# Patient Record
Sex: MALE | Race: WHITE | Employment: FULL TIME | URBAN - METROPOLITAN AREA
[De-identification: names, ages, dates, MRNs, and addresses within clinical notes are randomized per-mention and may not be internally consistent; named-entity substitution may affect disease eponyms.]

---

## 2017-06-30 ENCOUNTER — OFFICE VISIT (OUTPATIENT)
Dept: FAMILY MEDICINE | Facility: CLINIC | Age: 52
End: 2017-06-30
Payer: COMMERCIAL

## 2017-06-30 ENCOUNTER — RADIANT APPOINTMENT (OUTPATIENT)
Dept: GENERAL RADIOLOGY | Facility: CLINIC | Age: 52
End: 2017-06-30
Attending: NURSE PRACTITIONER
Payer: COMMERCIAL

## 2017-06-30 VITALS
BODY MASS INDEX: 23.99 KG/M2 | TEMPERATURE: 97.1 F | RESPIRATION RATE: 16 BRPM | HEIGHT: 69 IN | DIASTOLIC BLOOD PRESSURE: 74 MMHG | HEART RATE: 56 BPM | SYSTOLIC BLOOD PRESSURE: 132 MMHG | WEIGHT: 162 LBS

## 2017-06-30 DIAGNOSIS — M25.511 ACUTE PAIN OF RIGHT SHOULDER: ICD-10-CM

## 2017-06-30 DIAGNOSIS — M25.511 ACUTE PAIN OF RIGHT SHOULDER: Primary | ICD-10-CM

## 2017-06-30 PROCEDURE — 99213 OFFICE O/P EST LOW 20 MIN: CPT | Performed by: NURSE PRACTITIONER

## 2017-06-30 PROCEDURE — 73030 X-RAY EXAM OF SHOULDER: CPT | Mod: RT

## 2017-06-30 ASSESSMENT — PAIN SCALES - GENERAL: PAINLEVEL: EXTREME PAIN (8)

## 2017-06-30 NOTE — NURSING NOTE
"Chief Complaint   Patient presents with     Shoulder       Initial /74 (BP Location: Right arm, Patient Position: Chair, Cuff Size: Adult Regular)  Pulse 56  Temp 97.1  F (36.2  C) (Oral)  Resp 16  Ht 5' 9.25\" (1.759 m)  Wt 162 lb (73.5 kg)  BMI 23.75 kg/m2 Estimated body mass index is 23.75 kg/(m^2) as calculated from the following:    Height as of this encounter: 5' 9.25\" (1.759 m).    Weight as of this encounter: 162 lb (73.5 kg).  Medication Reconciliation: complete  "

## 2017-06-30 NOTE — PATIENT INSTRUCTIONS
Will let you know results of x ray.  Start PT.  If this doesn't resolve, will send to orthopedics for further evaluation.  Thyroid blood test is due next month.  Follow up if symptoms persist or worsen and as needed.        Thank you for choosing [facility].  You may be receiving a survey in the mail from [facility] regarding your visit today.  Please take a few minutes to complete and return the survey to let us know how we are doing.      Our Clinic hours are:  Mondays    7:20 am - 7 pm  Tues -  Fri  7:20 am - 5 pm    Clinic Phone: [phone]    The clinic lab opens at 7:30 am Mon - Fri and appointments are required.    [facility]. [phone]  Monday-Thursday 8 am - 7pm  Tues/Wed/Fri 8 am - 5:30 pm

## 2017-06-30 NOTE — MR AVS SNAPSHOT
After Visit Summary   6/30/2017    Ken Brock    MRN: [id number]           Patient Information     Date Of Birth          1965        Visit Information        Provider Department      6/30/2017 3:00 PM Kirsten Tavarez APRN CNP [facility] [place][note 2]        Today's Diagnoses     Acute pain of right shoulder    -  1      Care Instructions    Will let you know results of x ray.  Start PT.  If this doesn't resolve, will send to orthopedics for further evaluation.  Thyroid blood test is due next month.  Follow up if symptoms persist or worsen and as needed.        Thank you for choosing [facility].  You may be receiving a survey in the mail from [employer] regarding your visit today.  Please take a few minutes to complete and return the survey to let us know how we are doing.      Our Clinic hours are:  Mondays    7:20 am - 7 pm  Tues -  Fri  7:20 am - 5 pm    Clinic Phone: [phone]    The clinic lab opens at 7:30 am Mon - Fri and appointments are required.    [facility]  Ph. [phone]  Monday-Thursday 8 am - 7pm  Tues/Wed/Fri 8 am - 5:30 pm                 Follow-ups after your visit        Additional Services     PHYSICAL THERAPY REFERRAL       *This therapy referral will be filtered to a centralized scheduling office at [facility] and the patient will receive a call to schedule an appointment at a [place] location most convenient for them. *     [facility] provides Physical Therapy evaluation and treatment and many specialty services across the [place] system.  If requesting a specialty program, please choose from the list below.    If you have not heard from the scheduling office within 2 business days, please call [phone] for all locations, with the exception of [place], please call [phone].  Treatment: Evaluation & Treatment  Special Instructions/Modalities:   Special Programs:     Please  "be aware that coverage of these services is subject to the terms and limitations of your health insurance plan.  Call member services at your health plan with any benefit or coverage questions.      **Note to Provider:  If you are referring outside of [place] for the therapy appointment, please list the name of the location in the \"special instructions\" above, print the referral and give to the patient to schedule the appointment.                  Future tests that were ordered for you today     Open Future Orders        Priority Expected Expires Ordered    XR Shoulder Right G/E 3 Views Routine 6/30/2017 6/30/2018 6/30/2017            Who to contact     If you have questions or need follow up information about today's clinic visit or your schedule please contact [facility] directly at [phone].  Normal or non-critical lab and imaging results will be communicated to you by [employer], letter or phone within 4 business days after the clinic has received the results. If you do not hear from us within 7 days, please contact the clinic through [employer] or phone. If you have a critical or abnormal lab result, we will notify you by phone as soon as possible.  Submit refill requests through [employer] or call your pharmacy and they will forward the refill request to us. Please allow 3 business days for your refill to be completed.          Additional Information About Your Visit        [employer] Information     [employer] lets you send messages to your doctor, view your test results, renew your prescriptions, schedule appointments and more. To sign up, go to [email or web address] . Click on \"Log in\" on the left side of the screen, which will take you to the Welcome page. Then click on \"Sign up Now\" on the right side of the page.     You will be asked to enter the access code listed below, as well as some personal information. Please follow the directions to create your username and password.     Your access " "code is: [email or web address]  Expires: 2017  3:23 PM     Your access code will  in 90 days. If you need help or a new code, please call your [place] clinic or [phone].        Care EveryWhere ID     This is your Care EveryWhere ID. This could be used by other organizations to access your [place] medical records  [id number]        Your Vitals Were     Pulse Temperature Respirations Height BMI (Body Mass Index)       56 97.1  F (36.2  C) (Oral) 16 5' 9.25\" (1.759 m) 23.75 kg/m2        Blood Pressure from Last 3 Encounters:   17 132/74   16 122/70   05/22/15 104/60    Weight from Last 3 Encounters:   17 162 lb (73.5 kg)   16 169 lb 12.8 oz (77 kg)   05/22/15 173 lb 12.8 oz (78.8 kg)              We Performed the Following     PHYSICAL THERAPY REFERRAL        Primary Care Provider Office Phone # Fax #    R David Louis [phone]       [place] [address]        Equal Access to Services     OSVALDO CHOI AH: Hadii bandar gibbs hadasho Soomaali, waaxda luqadaha, qaybta kaalmada adeegyada, kait kohler. So [facility] [phone].    ATENCIÓN: Si habla español, tiene a verma disposición servicios gratuitos de asistencia lingüística. Llame al [phone].    We comply with applicable federal civil rights laws and [place] laws. We do not discriminate on the basis of race, color, national origin, age, disability sex, sexual orientation or gender identity.            Thank you!     Thank you for choosing [facility]  for your care. Our goal is always to provide you with excellent care. Hearing back from our patients is one way we can continue to improve our services. Please take a few minutes to complete the written survey that you may receive in the mail after your visit with us. Thank you!             Your Updated Medication List - Protect others around you: Learn how to safely use, store and " throw away your medicines at www.disposemymeds.org.          This list is accurate as of: 6/30/17  3:23 PM.  Always use your most recent med list.                   Brand Name Dispense Instructions for use Diagnosis    levothyroxine 112 MCG tablet    SYNTHROID/LEVOTHROID    180 tablet    Take 2 tablets daily    Acquired hypothyroidism

## 2017-06-30 NOTE — PROGRESS NOTES
SUBJECTIVE:                                                    Ken Brock is a 51 year old male who presents to clinic today for the following health issues:      Musculoskeletal problem/pain      Duration: hurt the shoulder 1 1/2 years ago but never got better. Now it is hurt in a different spot.    Description  Location: right shoulder    Intensity:  0-8/10    Accompanying signs and symptoms: radiation of pain to down the right arm, numbness, tingling, weakness of right arm and warmth    History  Previous similar problem: YES- 1 1/2 ago  Previous evaluation:  none    Precipitating or alleviating factors:  Trauma or overuse: YES- over use at work  Aggravating factors include: lifting, exercise and overuse    Therapies tried and outcome: rest/inactivity, heat, ice, massage and stretching          Problem list and histories reviewed & adjusted, as indicated.  Additional history: states he had similar problems 1.5 years ago. He's working at same job, [employer] doing delivery work.  He is right hand dominant and also sleeps on right side. Denies any specific injury or strain.  Pain is worse with certain movements and typically on the outer or front of shoulder.  States PT worked for him before.  No other joint pain.  He states he's feeling fine otherwise. Due for thyroid re check next month.      Patient Active Problem List   Diagnosis     Toxic diffuse goiter     CARDIOVASCULAR SCREENING; LDL GOAL LESS THAN 160     Acquired hypothyroidism     Past Surgical History:   Procedure Laterality Date     HC VASECTOMY UNILAT/BILAT W POSTOP SEMEN  2000    Vasectomy     SURGICAL HISTORY OF -   2005     Hand Carpal Tunnel, left       Social History   Substance Use Topics     Smoking status: Never Smoker     Smokeless tobacco: Current User     Types: Snuff      Comment: 1 can a week     Alcohol use Yes      Comment: occ     Family History   Problem Relation Age of Onset     Genitourinary Problems Father      kidney      "Cardiovascular Father      MI age 66     Cancer - colorectal Maternal Grandmother      CANCER Maternal Grandfather      Cancer - colorectal Paternal Grandfather      CEREBROVASCULAR DISEASE Mother      stroke         Current Outpatient Prescriptions   Medication Sig Dispense Refill     levothyroxine (SYNTHROID, LEVOTHROID) 112 MCG tablet Take 2 tablets daily 180 tablet 3     Allergies   Allergen Reactions     Amoxicillin Rash       Reviewed and updated as needed this visit by clinical staff  Tobacco  Allergies  Med Hx  Surg Hx  Fam Hx  Soc Hx      Reviewed and updated as needed this visit by Provider         10 point ROS of systems including Constitutional, Eyes, Respiratory, Cardiovascular, Gastroenterology, Genitourinary, Integumentary, Muscularskeletal, Psychiatric were all negative except for pertinent positives noted in my HPI.    OBJECTIVE:     /74 (BP Location: Right arm, Patient Position: Chair, Cuff Size: Adult Regular)  Pulse 56  Temp 97.1  F (36.2  C) (Oral)  Resp 16  Ht 5' 9.25\" (1.759 m)  Wt 162 lb (73.5 kg)  BMI 23.75 kg/m2  Body mass index is 23.75 kg/(m^2).  GENERAL: healthy, alert and no distress  HENT: pharynx without erythema  NECK: no adenopathy, no asymmetry  RESP: lungs clear to auscultation - no rales, rhonchi or wheezes  CV: regular rate and rhythm, normal S1 S2, no S3 or S4, no murmur  MS: no gross musculoskeletal defects noted, he has FROM of right shoulder with pain noted, pain is in outer right shoulder, pain with internal rotation      Diagnostic Test Results:  none     ASSESSMENT/PLAN:             1. Acute pain of right shoulder    - XR Shoulder Right G/E 3 Views; Future  - PHYSICAL THERAPY REFERRAL    See Patient Instructions  Patient Instructions   Will let you know results of x ray.  Start PT.  If this doesn't resolve, will send to orthopedics for further evaluation.  Thyroid blood test is due next month.  Follow up if symptoms persist or worsen and as " needed.        Thank you for choosing [facility].  You may be receiving a survey in the mail from [facility] regarding your visit today.  Please take a few minutes to complete and return the survey to let us know how we are doing.      Our Clinic hours are:  Mondays    7:20 am - 7 pm  Tues -  Fri  7:20 am - 5 pm    Clinic Phone: [phone]    The clinic lab opens at 7:30 am Mon - Fri and appointments are required.    [facility] [place]  Ph. [phone]  Monday-Thursday 8 am - 7pm  Tues/Wed/Fri 8 am - 5:30 pm             NATHALIE Mckeon CNP  [facility]

## 2017-11-19 DIAGNOSIS — E03.9 ACQUIRED HYPOTHYROIDISM: ICD-10-CM

## 2017-11-21 RX ORDER — LEVOTHYROXINE SODIUM 112 UG/1
224 TABLET ORAL DAILY
Qty: 60 TABLET | Refills: 0 | Status: SHIPPED | OUTPATIENT
Start: 2017-11-21 | End: 2017-12-22

## 2017-12-22 ENCOUNTER — OFFICE VISIT (OUTPATIENT)
Dept: FAMILY MEDICINE | Facility: CLINIC | Age: 52
End: 2017-12-22
Payer: COMMERCIAL

## 2017-12-22 VITALS
WEIGHT: 173.4 LBS | HEIGHT: 69 IN | BODY MASS INDEX: 25.68 KG/M2 | HEART RATE: 56 BPM | DIASTOLIC BLOOD PRESSURE: 80 MMHG | SYSTOLIC BLOOD PRESSURE: 128 MMHG

## 2017-12-22 DIAGNOSIS — E03.9 ACQUIRED HYPOTHYROIDISM: ICD-10-CM

## 2017-12-22 DIAGNOSIS — Z12.11 COLON CANCER SCREENING: ICD-10-CM

## 2017-12-22 DIAGNOSIS — Z00.00 ROUTINE GENERAL MEDICAL EXAMINATION AT A HEALTH CARE FACILITY: Primary | ICD-10-CM

## 2017-12-22 LAB
T4 FREE SERPL-MCNC: 1.44 NG/DL (ref 0.76–1.46)
TSH SERPL DL<=0.005 MIU/L-ACNC: 0.29 MU/L (ref 0.4–4)

## 2017-12-22 PROCEDURE — 36415 COLL VENOUS BLD VENIPUNCTURE: CPT | Performed by: FAMILY MEDICINE

## 2017-12-22 PROCEDURE — 84443 ASSAY THYROID STIM HORMONE: CPT | Performed by: FAMILY MEDICINE

## 2017-12-22 PROCEDURE — 99396 PREV VISIT EST AGE 40-64: CPT | Performed by: FAMILY MEDICINE

## 2017-12-22 PROCEDURE — 84439 ASSAY OF FREE THYROXINE: CPT | Performed by: FAMILY MEDICINE

## 2017-12-22 RX ORDER — LEVOTHYROXINE SODIUM 112 UG/1
224 TABLET ORAL DAILY
Qty: 180 TABLET | Refills: 3 | Status: SHIPPED | OUTPATIENT
Start: 2017-12-22 | End: 2018-12-29

## 2017-12-22 NOTE — PROGRESS NOTES
SUBJECTIVE:   CC: Ken Brock is an 52 year old male who presents for preventative health visit.     Healthy Habits:    Do you get at least three servings of calcium containing foods daily (dairy, green leafy vegetables, etc.)? no, taking calcium and/or vitamin D supplement: no    Amount of exercise or daily activities, outside of work: very active at work    Problems taking medications regularly No    Medication side effects: No    Have you had an eye exam in the past two years? no    Do you see a dentist twice per year? yes    Do you have sleep apnea, excessive snoring or daytime drowsiness?no         PROBLEMS TO ADD ON...  Hypothyroidism Follow-up      Since last visit, patient describes the following symptoms: Weight stable, no hair loss, no skin changes, no constipation, no loose stools        Today's PHQ-2 Score: PHQ-2 ( 1999 [employer]) 12/22/2017 6/30/2017   Q1: Little interest or pleasure in doing things 0 0   Q2: Feeling down, depressed or hopeless 0 0   PHQ-2 Score 0 0         Abuse: Current or Past(Physical, Sexual or Emotional)- No  Do you feel safe in your environment - Yes  Social History   Substance Use Topics     Smoking status: Never Smoker     Smokeless tobacco: Current User     Types: Snuff      Comment: 1 can a week     Alcohol use Yes      Comment: occ      If you drink alcohol do you typically have >3 drinks per day or >7 drinks per week? No                      Last PSA: No results found for: PSA    Reviewed orders with patient. Reviewed health maintenance and updated orders accordingly - Yes      Reviewed and updated as needed this visit by clinical staff  Tobacco  Allergies  Med Hx  Surg Hx  Fam Hx  Soc Hx        Reviewed and updated as needed this visit by Provider            ROS:  C: NEGATIVE for fever, chills, change in weight  I: NEGATIVE for worrisome rashes, moles or lesions  E: NEGATIVE for vision changes or irritation  ENT: NEGATIVE for ear, mouth and throat problems  R:  "NEGATIVE for significant cough or SOB  CV: NEGATIVE for chest pain, palpitations or peripheral edema  GI: NEGATIVE for nausea, abdominal pain, heartburn, or change in bowel habits   male: negative for dysuria, hematuria, decreased urinary stream, erectile dysfunction, urethral discharge  M: NEGATIVE for significant arthralgias or myalgia  N: NEGATIVE for weakness, dizziness or paresthesias  P: NEGATIVE for changes in mood or affect    OBJECTIVE:   /80 (BP Location: Right arm, Patient Position: Chair, Cuff Size: Adult Regular)  Pulse 56  Ht 5' 9.25\" (1.759 m)  Wt 173 lb 6.4 oz (78.7 kg)  BMI 25.42 kg/m2  EXAM:  GENERAL: healthy, alert and no distress  EYES: Eyes grossly normal to inspection, PERRL and conjunctivae and sclerae normal  HENT: ear canals and TM's normal, nose and mouth without ulcers or lesions  NECK: no adenopathy, no asymmetry, masses, or scars and thyroid normal to palpation  RESP: lungs clear to auscultation - no rales, rhonchi or wheezes  CV: regular rate and rhythm, normal S1 S2, no S3 or S4, no murmur, click or rub, no peripheral edema and peripheral pulses strong  ABDOMEN: soft, nontender, no hepatosplenomegaly, no masses and bowel sounds normal  MS: no gross musculoskeletal defects noted, no edema  SKIN: no suspicious lesions or rashes  NEURO: Normal strength and tone, mentation intact and speech normal  PSYCH: mentation appears normal, affect normal/bright    Results for orders placed or performed in visit on 12/22/17   TSH with free T4 reflex   Result Value Ref Range    TSH 0.29 (L) 0.40 - 4.00 mU/L      ASSESSMENT/PLAN:     ASSESSMENT:  1. Routine general medical examination at a health care facility    2. Acquired hypothyroidism    3. Colon cancer screening        PLAN:  Orders Placed This Encounter     Fecal colorectal cancer screen (FIT)     TSH with free T4 reflex     We will continue the current dose of thyroid medication    Patient Instructions     Preventive Health " Recommendations  Male Ages 50 - 64    Yearly exam:             See your health care provider every year in order to  o   Review health changes.   o   Discuss preventive care.    o   Review your medicines if your doctor has prescribed any.     Have a cholesterol test every 5 years, or more frequently if you are at risk for high cholesterol/heart disease.     Have a diabetes test (fasting glucose) every three years. If you are at risk for diabetes, you should have this test more often.     Have a colonoscopy at age 50, or have a yearly FIT test (stool test). These exams will check for colon cancer.      Talk with your health care provider about whether or not a prostate cancer screening test (PSA) is right for you.    You should be tested each year for STDs (sexually transmitted diseases), if you re at risk.     Shots: Get a flu shot each year. Get a tetanus shot every 10 years.     Nutrition:    Eat at least 5 servings of fruits and vegetables daily.     Eat whole-grain bread, whole-wheat pasta and brown rice instead of white grains and rice.     Talk to your provider about Calcium and Vitamin D.     Lifestyle    Exercise for at least 150 minutes a week (30 minutes a day, 5 days a week). This will help you control your weight and prevent disease.     Limit alcohol to one drink per day.     No smoking.     Wear sunscreen to prevent skin cancer.     See your dentist every six months for an exam and cleaning.     See your eye doctor every 1 to 2 years.       COUNSELING:  Reviewed preventive health counseling, as reflected in patient instructions       Regular exercise       Healthy diet/nutrition       Vision screening       Hearing screening       reports that he has never smoked. His smokeless tobacco use includes Snuff.  Tobacco Cessation Action Plan: Information offered: Patient not interested at this time  Estimated body mass index is 25.42 kg/(m^2) as calculated from the following:    Height as of this encounter:  "5' 9.25\" (1.759 m).    Weight as of this encounter: 173 lb 6.4 oz (78.7 kg).   Weight management plan: Discussed healthy diet and exercise guidelines and patient will follow up in 12 months in clinic to re-evaluate.    Counseling Resources:  ATP IV Guidelines  Pooled Cohorts Equation Calculator  FRAX Risk Assessment  ICSI Preventive Guidelines  Dietary Guidelines for Americans, 2010  USDA's MyPlate  ASA Prophylaxis  Lung CA Screening    ANJANA Louis MD  [facility]  "

## 2017-12-22 NOTE — NURSING NOTE
"Chief Complaint   Patient presents with     Physical     Refill Request     on medications     Health Maintenance     pt. is due for colonoscopy.        Initial /80 (BP Location: Right arm, Patient Position: Chair, Cuff Size: Adult Regular)  Pulse 56  Ht 5' 9.25\" (1.759 m)  Wt 173 lb 6.4 oz (78.7 kg)  BMI 25.42 kg/m2 Estimated body mass index is 25.42 kg/(m^2) as calculated from the following:    Height as of this encounter: 5' 9.25\" (1.759 m).    Weight as of this encounter: 173 lb 6.4 oz (78.7 kg).  Medication Reconciliation: complete     Lula Sandhu, CMA      "

## 2017-12-22 NOTE — LETTER
December 26, 2017      Ken Brock  [address] [address]        Dear ,    We are writing to inform you of your test results.    T4 level was normal.   Continue same medications and repeat in one year or if any symptoms of concern.    Results for orders placed or performed in visit on 12/22/17   TSH with free T4 reflex   Result Value Ref Range    TSH 0.29 (L) 0.40 - 4.00 mU/L   T4 free   Result Value Ref Range    T4 Free 1.44 0.76 - 1.46 ng/dL         If you have any questions or concerns, please call the clinic at the number listed above.       Sincerely,        ANJANA Louis MD/ NATHALIE Sanders CNP/la

## 2017-12-22 NOTE — MR AVS SNAPSHOT
After Visit Summary   12/22/2017    Ken Brock    MRN: [id number]           Patient Information     Date Of Birth          1965        Visit Information        Provider Department      12/22/2017 9:40 AM ANJANA Louis MD [facility]        Today's Diagnoses     Routine general medical examination at a health care facility    -  1    Colon cancer screening        Acquired hypothyroidism          Care Instructions      Preventive Health Recommendations  Male Ages 50 - 64    Yearly exam:             See your health care provider every year in order to  o   Review health changes.   o   Discuss preventive care.    o   Review your medicines if your doctor has prescribed any.     Have a cholesterol test every 5 years, or more frequently if you are at risk for high cholesterol/heart disease.     Have a diabetes test (fasting glucose) every three years. If you are at risk for diabetes, you should have this test more often.     Have a colonoscopy at age 50, or have a yearly FIT test (stool test). These exams will check for colon cancer.      Talk with your health care provider about whether or not a prostate cancer screening test (PSA) is right for you.    You should be tested each year for STDs (sexually transmitted diseases), if you re at risk.     Shots: Get a flu shot each year. Get a tetanus shot every 10 years.     Nutrition:    Eat at least 5 servings of fruits and vegetables daily.     Eat whole-grain bread, whole-wheat pasta and brown rice instead of white grains and rice.     Talk to your provider about Calcium and Vitamin D.     Lifestyle    Exercise for at least 150 minutes a week (30 minutes a day, 5 days a week). This will help you control your weight and prevent disease.     Limit alcohol to one drink per day.     No smoking.     Wear sunscreen to prevent skin cancer.     See your dentist every six months for an exam and cleaning.     See your eye doctor every 1 to 2  "years.            Follow-ups after your visit        Future tests that were ordered for you today     Open Future Orders        Priority Expected Expires Ordered    Fecal colorectal cancer screen (FIT) Routine 2018 3/16/2018 2017            Who to contact     If you have questions or need follow up information about today's clinic visit or your schedule please contact [facility] directly at [phone].  Normal or non-critical lab and imaging results will be communicated to you by UberMediahart, letter or phone within 4 business days after the clinic has received the results. If you do not hear from us within 7 days, please contact the clinic through UberMediahart or phone. If you have a critical or abnormal lab result, we will notify you by phone as soon as possible.  Submit refill requests through Language Learning Class or call your pharmacy and they will forward the refill request to us. Please allow 3 business days for your refill to be completed.          Additional Information About Your Visit        UberMediaharEduKart Information     Language Learning Class lets you send messages to your doctor, view your test results, renew your prescriptions, schedule appointments and more. To sign up, go to [email or web address] . Click on \"Log in\" on the left side of the screen, which will take you to the Welcome page. Then click on \"Sign up Now\" on the right side of the page.     You will be asked to enter the access code listed below, as well as some personal information. Please follow the directions to create your username and password.     Your access code is: [email or web address]  Expires: 3/22/2018 10:15 AM     Your access code will  in 90 days. If you need help or a new code, please call your [place] clinic or [phone].        Care EveryWhere ID     This is your Care EveryWhere ID. This could be used by other organizations to access your [place] medical records  [id number]        Your Vitals Were     Pulse Height BMI (Body Mass " "Index)             56 5' 9.25\" (1.759 m) 25.42 kg/m2          Blood Pressure from Last 3 Encounters:   12/22/17 128/80   06/30/17 132/74   07/29/16 122/70    Weight from Last 3 Encounters:   12/22/17 173 lb 6.4 oz (78.7 kg)   06/30/17 162 lb (73.5 kg)   07/29/16 169 lb 12.8 oz (77 kg)              We Performed the Following     TSH with free T4 reflex        Primary Care Provider Office Phone # Fax #    R David Louis [phone] [address] [facility] [address]        Equal Access to Services     OSVALDO CHOI : Marco A Bajwa, allen tripp, suman fraser, kait kohler. So [facility] [phone].    ATENCIÓN: Si habla español, tiene a verma disposición servicios gratuitos de asistencia lingüística. Llame al [phone].    We comply with applicable federal civil rights laws and [place] laws. We do not discriminate on the basis of race, color, national origin, age, disability, sex, sexual orientation, or gender identity.            Thank you!     Thank you for choosing [facility]  for your care. Our goal is always to provide you with excellent care. Hearing back from our patients is one way we can continue to improve our services. Please take a few minutes to complete the written survey that you may receive in the mail after your visit with us. Thank you!             Your Updated Medication List - Protect others around you: Learn how to safely use, store and throw away your medicines at www.disposemymeds.org.          This list is accurate as of: 12/22/17 10:15 AM.  Always use your most recent med list.                   Brand Name Dispense Instructions for use Diagnosis    levothyroxine 112 MCG tablet    SYNTHROID/LEVOTHROID    60 tablet    Take 2 tablets (224 mcg) by mouth daily (Needs follow-up appointment for this medication)    Acquired hypothyroidism         "

## 2017-12-23 NOTE — PROGRESS NOTES
Please SEND LETTER    Your TSH is just slightly low.  Continue the current dose of thyroid medication

## 2018-01-26 DIAGNOSIS — Z12.11 COLON CANCER SCREENING: ICD-10-CM

## 2018-01-26 PROCEDURE — 82274 ASSAY TEST FOR BLOOD FECAL: CPT | Performed by: FAMILY MEDICINE

## 2018-01-26 NOTE — LETTER
[facility]  [address]  [place] [address]  Phone: [phone]      1/29/2018     Ken Brock  [address] [address] [address]      Dear Ken:    Thank you for allowing me to participate in your care. Your recent test results were reviewed and listed below.      Your take home screening test for colon cancer was negative/normal. We will repeat this test again next year for your annual screening. If you have any additional questions or concerns please call the clinic at [phone].  Results for orders placed or performed in visit on 01/26/18   Fecal colorectal cancer screen (FIT)   Result Value Ref Range    Occult Blood Scn FIT Negative NEG^Negative       Thank you for choosing [facility]. As a result, please continue with the treatment plan discussed in the office. Return as discussed or sooner if symptoms worsen or fail to improve. If you have any further questions or concerns, please do not hesitate to contact us.      Sincerely,        Dr. David Louis/Malou Silva CMA

## 2018-01-28 LAB — HEMOCCULT STL QL IA: NEGATIVE

## 2019-02-08 ENCOUNTER — OFFICE VISIT (OUTPATIENT)
Dept: FAMILY MEDICINE | Facility: CLINIC | Age: 54
End: 2019-02-08
Payer: COMMERCIAL

## 2019-02-08 VITALS
HEART RATE: 64 BPM | OXYGEN SATURATION: 99 % | HEIGHT: 69 IN | DIASTOLIC BLOOD PRESSURE: 70 MMHG | BODY MASS INDEX: 25.77 KG/M2 | SYSTOLIC BLOOD PRESSURE: 108 MMHG | TEMPERATURE: 97.8 F | WEIGHT: 174 LBS

## 2019-02-08 DIAGNOSIS — E03.9 ACQUIRED HYPOTHYROIDISM: Primary | ICD-10-CM

## 2019-02-08 LAB
T4 FREE SERPL-MCNC: 1.1 NG/DL (ref 0.76–1.46)
TSH SERPL DL<=0.005 MIU/L-ACNC: 0.08 MU/L (ref 0.4–4)

## 2019-02-08 PROCEDURE — 84443 ASSAY THYROID STIM HORMONE: CPT | Performed by: FAMILY MEDICINE

## 2019-02-08 PROCEDURE — 84439 ASSAY OF FREE THYROXINE: CPT | Performed by: FAMILY MEDICINE

## 2019-02-08 PROCEDURE — 36415 COLL VENOUS BLD VENIPUNCTURE: CPT | Performed by: FAMILY MEDICINE

## 2019-02-08 PROCEDURE — 99213 OFFICE O/P EST LOW 20 MIN: CPT | Performed by: FAMILY MEDICINE

## 2019-02-08 RX ORDER — LEVOTHYROXINE SODIUM 112 UG/1
224 TABLET ORAL DAILY
Qty: 180 TABLET | Refills: 3 | Status: SHIPPED | OUTPATIENT
Start: 2019-02-08 | End: 2020-03-10

## 2019-02-08 ASSESSMENT — MIFFLIN-ST. JEOR: SCORE: 1638.6

## 2019-02-08 NOTE — NURSING NOTE
"Initial /70   Pulse 64   Temp 97.8  F (36.6  C) (Tympanic)   Ht 1.759 m (5' 9.25\")   Wt 78.9 kg (174 lb)   SpO2 99%   BMI 25.51 kg/m   Estimated body mass index is 25.51 kg/m  as calculated from the following:    Height as of this encounter: 1.759 m (5' 9.25\").    Weight as of this encounter: 78.9 kg (174 lb). .    Gia Gallagher CMA ([facility])  "

## 2019-02-08 NOTE — PROGRESS NOTES
"  SUBJECTIVE:   Ken Brock is a 53 year old male who presents to clinic today for the following health issues:    Hypothyroidism Follow-up      Since last visit, patient describes the following symptoms: Weight stable, no hair loss, no skin changes, no constipation, no loose stools      Amount of exercise or physical activity: 2-3 days/week for an average of 15-30 minutes    Problems taking medications regularly: No    Medication side effects: none    Diet: regular (no restrictions)        Current Outpatient Medications:      levothyroxine (SYNTHROID/LEVOTHROID) 112 MCG tablet, Take 2 tablets (224 mcg) by mouth daily DUE FOR VISIT AND LABS, Disp: 180 tablet, Rfl: 3    Patient Active Problem List   Diagnosis     Toxic diffuse goiter     CARDIOVASCULAR SCREENING; LDL GOAL LESS THAN 160     Acquired hypothyroidism     Blood pressure 108/70, pulse 64, temperature 97.8  F (36.6  C), temperature source Tympanic, height 1.759 m (5' 9.25\"), weight 78.9 kg (174 lb), SpO2 99 %.    Exam:  GENERAL APPEARANCE: healthy, alert and no distress  EYES: EOMI,  PERRL  NECK: no adenopathy, no asymmetry, masses, or scars and thyroid normal to palpation  RESP: lungs clear to auscultation - no rales, rhonchi or wheezes  CV: regular rates and rhythm, normal S1 S2, no S3 or S4 and no murmur, click or rub -  MS: extremities normal- no gross deformities noted, no evidence of inflammation in joints, FROM in all extremities.  SKIN: no suspicious lesions or rashes  PSYCH: mentation appears normal and affect normal/bright      (E03.9) Acquired hypothyroidism  (primary encounter diagnosis)  Comment:   Plan: TSH, T4 FREE, TSH, T4 FREE, levothyroxine         (SYNTHROID/LEVOTHROID) 112 MCG tablet        We discussed the med and do the labs today and we will call the results and recommendations.   If doing well then refill and recheck in one year. Monitor for the symptoms of high and low thyroid.   Recheck in one year and do the labs before the " appt. Iegf [phone].     Koffi Dallas

## 2019-02-08 NOTE — PATIENT INSTRUCTIONS
(E03.9) Acquired hypothyroidism  (primary encounter diagnosis)  Comment:   Plan: TSH, T4 FREE, TSH, T4 FREE, levothyroxine         (SYNTHROID/LEVOTHROID) 112 MCG tablet        We discussed the med and do the labs today and we will call the results and recommendations.   If doing well then refill and recheck in one year. Monitor for the symptoms of high and low thyroid.   Recheck in one year and do the labs before the appt. Call [phone].

## 2019-06-25 ENCOUNTER — TELEPHONE (OUTPATIENT)
Dept: FAMILY MEDICINE | Facility: CLINIC | Age: 54
End: 2019-06-25

## 2019-06-25 NOTE — TELEPHONE ENCOUNTER
"S:  Patient calling with status update    B:  Patient reports he had a \"heart cramp\" about three weeks ago while at a wedding, his heart was \"very sore\" for a few days after that and he has had 1 of 10 pain in chest since while exerting himself at work.    A:  Denies SOB at this time but states he has been breathing a little harder over the last few weeks when exerting himself physically at work.  Patient denies dizziness and weakness  Patient denies cool/moist skin stating he is at baseline  Denies nausea and vomiting  Patient denies radiating pain  Denies cyanosis and/or grey skin  Patient reports pain in chest 1 of 10 right now while working  Patient's father  of a heart attack  Patient states he does smoke cigarettes daily    R:  Writer instructed patient to go to the [place] [facility]gent [facility] now to be evaluated for chest pain.  Patient stated he would go to the Urgent Care.    No further action necessary.  Encounter closed.    Chandler Brooks RN    "

## 2019-06-26 ENCOUNTER — APPOINTMENT (OUTPATIENT)
Dept: GENERAL RADIOLOGY | Facility: CLINIC | Age: 54
End: 2019-06-26
Attending: EMERGENCY MEDICINE
Payer: COMMERCIAL

## 2019-06-26 ENCOUNTER — HOSPITAL ENCOUNTER (EMERGENCY)
Facility: CLINIC | Age: 54
Discharge: HOME | End: 2019-06-26
Attending: EMERGENCY MEDICINE | Admitting: EMERGENCY MEDICINE
Payer: COMMERCIAL

## 2019-06-26 VITALS
WEIGHT: 180 LBS | HEART RATE: 51 BPM | TEMPERATURE: 98 F | RESPIRATION RATE: 23 BRPM | OXYGEN SATURATION: 98 % | SYSTOLIC BLOOD PRESSURE: 120 MMHG | BODY MASS INDEX: 25.77 KG/M2 | DIASTOLIC BLOOD PRESSURE: 83 MMHG | HEIGHT: 70 IN

## 2019-06-26 DIAGNOSIS — R07.9 CHEST PAIN, UNSPECIFIED TYPE: ICD-10-CM

## 2019-06-26 LAB
ALBUMIN SERPL-MCNC: 4.3 G/DL (ref 3.4–5)
ALP SERPL-CCNC: 70 U/L (ref 40–150)
ALT SERPL W P-5'-P-CCNC: 25 U/L (ref 0–70)
ANION GAP SERPL CALCULATED.3IONS-SCNC: 9 MMOL/L (ref 3–14)
AST SERPL W P-5'-P-CCNC: 26 U/L (ref 0–45)
BASOPHILS # BLD AUTO: 0.1 10E9/L (ref 0–0.2)
BASOPHILS NFR BLD AUTO: 1.6 %
BILIRUB SERPL-MCNC: 0.4 MG/DL (ref 0.2–1.3)
BUN SERPL-MCNC: 14 MG/DL (ref 7–30)
CALCIUM SERPL-MCNC: 9.2 MG/DL (ref 8.5–10.1)
CHLORIDE SERPL-SCNC: 105 MMOL/L (ref 94–109)
CO2 SERPL-SCNC: 22 MMOL/L (ref 20–32)
CREAT SERPL-MCNC: 1.01 MG/DL (ref 0.66–1.25)
D DIMER PPP FEU-MCNC: <0.3 UG/ML FEU (ref 0–0.5)
DIFFERENTIAL METHOD BLD: ABNORMAL
EOSINOPHIL # BLD AUTO: 0.1 10E9/L (ref 0–0.7)
EOSINOPHIL NFR BLD AUTO: 2.3 %
ERYTHROCYTE [DISTWIDTH] IN BLOOD BY AUTOMATED COUNT: 12.1 % (ref 10–15)
GFR SERPL CREATININE-BSD FRML MDRD: 85 ML/MIN/{1.73_M2}
GLUCOSE SERPL-MCNC: 85 MG/DL (ref 70–99)
HCT VFR BLD AUTO: 41.6 % (ref 40–53)
HGB BLD-MCNC: 13.9 G/DL (ref 13.3–17.7)
IMM GRANULOCYTES # BLD: 0 10E9/L (ref 0–0.4)
IMM GRANULOCYTES NFR BLD: 0.2 %
LYMPHOCYTES # BLD AUTO: 1.9 10E9/L (ref 0.8–5.3)
LYMPHOCYTES NFR BLD AUTO: 34.1 %
MCH RBC QN AUTO: 33 PG (ref 26.5–33)
MCHC RBC AUTO-ENTMCNC: 33.4 G/DL (ref 31.5–36.5)
MCV RBC AUTO: 99 FL (ref 78–100)
MONOCYTES # BLD AUTO: 0.6 10E9/L (ref 0–1.3)
MONOCYTES NFR BLD AUTO: 11.4 %
NEUTROPHILS # BLD AUTO: 2.8 10E9/L (ref 1.6–8.3)
NEUTROPHILS NFR BLD AUTO: 50.4 %
NRBC # BLD AUTO: 0 10*3/UL
NRBC BLD AUTO-RTO: 0 /100
PLATELET # BLD AUTO: 245 10E9/L (ref 150–450)
POTASSIUM SERPL-SCNC: 3.7 MMOL/L (ref 3.4–5.3)
PROT SERPL-MCNC: 7.8 G/DL (ref 6.8–8.8)
RBC # BLD AUTO: 4.21 10E12/L (ref 4.4–5.9)
SODIUM SERPL-SCNC: 136 MMOL/L (ref 133–144)
TROPONIN I SERPL-MCNC: <0.015 UG/L (ref 0–0.04)
TSH SERPL DL<=0.005 MIU/L-ACNC: 0.44 MU/L (ref 0.4–4)
WBC # BLD AUTO: 5.6 10E9/L (ref 4–11)

## 2019-06-26 PROCEDURE — 99285 EMERGENCY DEPT VISIT HI MDM: CPT | Mod: 25 | Performed by: EMERGENCY MEDICINE

## 2019-06-26 PROCEDURE — 80053 COMPREHEN METABOLIC PANEL: CPT | Performed by: EMERGENCY MEDICINE

## 2019-06-26 PROCEDURE — 84443 ASSAY THYROID STIM HORMONE: CPT | Performed by: EMERGENCY MEDICINE

## 2019-06-26 PROCEDURE — 85379 FIBRIN DEGRADATION QUANT: CPT | Performed by: EMERGENCY MEDICINE

## 2019-06-26 PROCEDURE — 84484 ASSAY OF TROPONIN QUANT: CPT | Performed by: EMERGENCY MEDICINE

## 2019-06-26 PROCEDURE — 71046 X-RAY EXAM CHEST 2 VIEWS: CPT

## 2019-06-26 PROCEDURE — 85025 COMPLETE CBC W/AUTO DIFF WBC: CPT | Performed by: EMERGENCY MEDICINE

## 2019-06-26 PROCEDURE — 93010 ELECTROCARDIOGRAM REPORT: CPT | Mod: Z6 | Performed by: EMERGENCY MEDICINE

## 2019-06-26 PROCEDURE — 99285 EMERGENCY DEPT VISIT HI MDM: CPT | Performed by: EMERGENCY MEDICINE

## 2019-06-26 PROCEDURE — 93005 ELECTROCARDIOGRAM TRACING: CPT | Performed by: EMERGENCY MEDICINE

## 2019-06-26 ASSESSMENT — MIFFLIN-ST. JEOR: SCORE: 1677.72

## 2019-06-26 NOTE — ED AVS SNAPSHOT
[facility] Emergency Department  [address] [address]  Phone:  [phone]  Fax:  [phone]                                    Ken Brock   MRN: [id number]    Department:  [facility] Emergency Department   Date of Visit:  6/26/2019           After Visit Summary Signature Page    I have received my discharge instructions, and my questions have been answered. I have discussed any challenges I see with this plan with the nurse or doctor.    ..........................................................................................................................................  Patient/Patient Representative Signature      ..........................................................................................................................................  Patient Representative Print Name and Relationship to Patient    ..................................................               ................................................  Date                                   Time    ..........................................................................................................................................  Reviewed by Signature/Title    ...................................................              ..............................................  Date                                               Time          22EPIC Rev 08/18

## 2019-06-27 NOTE — ED PROVIDER NOTES
"  History     Chief Complaint   Patient presents with     Chest Pain     \"I had a cramp in my heart on May 31st and have had cp ever since.\" c/o soa,      HPI  Ken Brock is a 53 year old male with history of toxic diffuse goiter, and hypothyroidism who presents to the ED complaining of right-sided chest pain and shortness of breath that began 5/31. Patient was at a wedding when he experienced a \"cramp\" in his chest/heart and was dropped to his knees. He proceeded to get up and walk it off. Patient reports that he has been feeling short of air and having consistent chest pains ever since. He called to make an appointment at the clinic but was directed to the ED. His pain is worsened with deep breaths and he has been getting tired easier. He denies coughing.  He has not had a headache.  He denies any visual changes.  He denies any nausea vomiting or diarrhea.  He has not had any abdominal pain.  He denies any focal numbness weakness any extremity.  He has not had any bowel or bladder dysfunction.  He has not had a rash.  Currently rates his pain a 2 out of 10.  Worsened with deep breathing.    Patient has no personal history of hypertension or hyperlipidemia. He adds that his father was on medication for hypertension and ultimately passed at 76 from a heart attack.     Social History:  Patient arrived alone by personal vehicle. Current-smoker - 1 pack/week, also uses chewing tobacco products.    Patient Active Problem List   Diagnosis     Toxic diffuse goiter     CARDIOVASCULAR SCREENING; LDL GOAL LESS THAN 160     Acquired hypothyroidism     Current Outpatient Medications   Medication Sig Dispense Refill     levothyroxine (SYNTHROID/LEVOTHROID) 112 MCG tablet Take 2 tablets (224 mcg) by mouth daily DUE FOR VISIT AND LABS 180 tablet 3       Allergies:  Allergies   Allergen Reactions     Amoxicillin Rash       Problem List:    Patient Active Problem List    Diagnosis Date Noted     Acquired hypothyroidism " "07/22/2016     Priority: Medium     secondary to radioactive iodine tx for Graves disease       CARDIOVASCULAR SCREENING; LDL GOAL LESS THAN 160 10/31/2010     Priority: Medium     Toxic diffuse goiter 11/30/2005     Priority: Medium     Graves disease COKER with subsequent  hypothyroidism  Problem list name updated by automated process. Provider to review          Past Medical History:    Past Medical History:   Diagnosis Date     Toxic diffuse goiter without mention of thyrotoxic crisis or storm 1991     Unspecified hypothyroidism        Past Surgical History:    Past Surgical History:   Procedure Laterality Date     HC VASECTOMY UNILAT/BILAT W POSTOP SEMEN  2000    Vasectomy     SURGICAL HISTORY OF -   2005     Hand Carpal Tunnel, left       Family History:    Family History   Problem Relation Age of Onset     Genitourinary Problems Father         kidney     Cardiovascular Father         MI age 66     Cancer - colorectal Maternal Grandmother      Cancer Maternal Grandfather      Cancer - colorectal Paternal Grandfather      Cerebrovascular Disease Mother         stroke       Social History:  Marital Status:   [2]  Social History     Tobacco Use     Smoking status: Never Smoker     Smokeless tobacco: Current User     Types: Snuff     Tobacco comment: 1 can a week   Substance Use Topics     Alcohol use: Yes     Comment: occ     Drug use: No        Medications:      levothyroxine (SYNTHROID/LEVOTHROID) 112 MCG tablet         ROS  All systems reviewed and other than pertinent positives and negatives in HPI all other systems are negative.    Physical Exam   BP: 154/90  Pulse: 58  Temp: 98  F (36.7  C)  Height: 177.8 cm (5' 10\")  Weight: 81.6 kg (180 lb)  SpO2: 98 %      Physical Exam   Constitutional: He appears well-developed and well-nourished. No distress.   Eyes: Conjunctivae are normal.   Psychiatric: He has a normal mood and affect.   Nursing note and vitals reviewed.       HENT: Oral mucosa moist. No " lesions.  Neck: Supple no JVD present  Pulmonary/Chest: Lungs are clear to auscultation bilaterally.  Mild anterior lower sternal chest pain palpation is somewhat reproduced  Cardiovascular: Heart is regular rate and rhythm. No murmur.  Abdomen: Soft, non-distended, non-tender.   Musculoskeletal: Moving all extremities well. No peripheral edema.  No calf tenderness.  Neurological: Alert and oriented.  No focal neurologic deficit.   Skin: No rash.    ED Course     Results for orders placed or performed during the hospital encounter of 06/26/19 (from the past 24 hour(s))   CBC with platelets differential   Result Value Ref Range    WBC 5.6 4.0 - 11.0 10e9/L    RBC Count 4.21 (L) 4.4 - 5.9 10e12/L    Hemoglobin 13.9 13.3 - 17.7 g/dL    Hematocrit 41.6 40.0 - 53.0 %    MCV 99 78 - 100 fl    MCH 33.0 26.5 - 33.0 pg    MCHC 33.4 31.5 - 36.5 g/dL    RDW 12.1 10.0 - 15.0 %    Platelet Count 245 150 - 450 10e9/L    Diff Method Automated Method     % Neutrophils 50.4 %    % Lymphocytes 34.1 %    % Monocytes 11.4 %    % Eosinophils 2.3 %    % Basophils 1.6 %    % Immature Granulocytes 0.2 %    Nucleated RBCs 0 0 /100    Absolute Neutrophil 2.8 1.6 - 8.3 10e9/L    Absolute Lymphocytes 1.9 0.8 - 5.3 10e9/L    Absolute Monocytes 0.6 0.0 - 1.3 10e9/L    Absolute Eosinophils 0.1 0.0 - 0.7 10e9/L    Absolute Basophils 0.1 0.0 - 0.2 10e9/L    Abs Immature Granulocytes 0.0 0 - 0.4 10e9/L    Absolute Nucleated RBC 0.0        Medications - No data to display    7:45 PM         Procedures               EKG Interpretation:      Interpreted by Koffi Figueroa  Rhythm: sinus bradycardia  Rate: 50-60  Axis: Normal  Ectopy: none  Conduction: normal  ST Segments/ T Waves: Non-specific ST-T wave changes  Q Waves: none  Comparison to prior: No old EKG available    Clinical Impression: sinus bradycardia                Critical Care time:  none               Results for orders placed or performed during the hospital encounter of 06/26/19 (from  the past 24 hour(s))   CBC with platelets differential   Result Value Ref Range    WBC 5.6 4.0 - 11.0 10e9/L    RBC Count 4.21 (L) 4.4 - 5.9 10e12/L    Hemoglobin 13.9 13.3 - 17.7 g/dL    Hematocrit 41.6 40.0 - 53.0 %    MCV 99 78 - 100 fl    MCH 33.0 26.5 - 33.0 pg    MCHC 33.4 31.5 - 36.5 g/dL    RDW 12.1 10.0 - 15.0 %    Platelet Count 245 150 - 450 10e9/L    Diff Method Automated Method     % Neutrophils 50.4 %    % Lymphocytes 34.1 %    % Monocytes 11.4 %    % Eosinophils 2.3 %    % Basophils 1.6 %    % Immature Granulocytes 0.2 %    Nucleated RBCs 0 0 /100    Absolute Neutrophil 2.8 1.6 - 8.3 10e9/L    Absolute Lymphocytes 1.9 0.8 - 5.3 10e9/L    Absolute Monocytes 0.6 0.0 - 1.3 10e9/L    Absolute Eosinophils 0.1 0.0 - 0.7 10e9/L    Absolute Basophils 0.1 0.0 - 0.2 10e9/L    Abs Immature Granulocytes 0.0 0 - 0.4 10e9/L    Absolute Nucleated RBC 0.0      Results for orders placed or performed during the hospital encounter of 06/26/19   Chest XR,  PA & LAT    Narrative    CHEST TWO VIEWS  6/26/2019 8:09 PM     HISTORY: 53-year-old patient with shortness of breath and chest pain.    COMPARISON: None.      Impression    IMPRESSION: Heart size is normal. No pleural effusion, pneumothorax,  or abnormal area of consolidation.    LISA RAYMUNDO MD       Medications - No data to display    Assessments & Plan (with Medical Decision Making) records were reviewed.  Labs EKG chest x-ray were obtained.  EKG revealed sinus bradycardia with no significant ST-T wave changes.  White count was not elevated and there is no left shift.  Comprehensive metabolic panel was unremarkable.  Troponin was within normal limits.  D-dimer was less than 0.3.  TSH was within normal limits.  Findings were discussed in detail with the patient.  Patient's pain is been going on for several weeks constantly and his EKG troponins and labs are normal limits.  Chest x-ray is unremarkable.  This could be pleurisy versus musculoskeletal pain.  I have  advised him to take ibuprofen or Tylenol and follow-up with his primary care later this week for further evaluation and care.  Patient is agreement with this plan.     I have reviewed the nursing notes.    I have reviewed the findings, diagnosis, plan and need for follow up with the patient.          Medication List      There are no discharge medications for this visit.         Final diagnoses:   Chest pain, unspecified type   This document serves as a record of services personally performed by Koffi Figueroa MD. It was created on their behalf by Kimani Radford, a trained medical scribe. The creation of this record is based on the provider's personal observations and the statements of the patient. This document has been checked and approved by the attending provider.    Note: Chart documentation done in part with Dragon Voice Recognition software. Although reviewed after completion, some word and grammatical errors may remain.    6/26/2019   [facility] EMERGENCY DEPARTMENT     Koffi Figueroa MD  06/29/19 [id number]

## 2019-06-27 NOTE — ED NOTES
Pt presents to ED with complaints of CP.  Intermittent since May 31st.  Pt states that when CP occurs he becomes SOA.  Pt is alert and oriented.  NO signs of distress.  Denies cardiac issues in the past.  Pain states pain is in lower chest bilaterally, rates 1/10.

## 2019-06-27 NOTE — DISCHARGE INSTRUCTIONS
Return if symptoms worsen or new symptoms.  Follow-up with primary care physician this week for recheck and possible stress test.  Take ibuprofen or follow-up for pain.  If pain changes or worsens please return for recheck.  Any shortness of breath or other symptoms present please return for recheck.

## 2020-03-08 DIAGNOSIS — E03.9 ACQUIRED HYPOTHYROIDISM: ICD-10-CM

## 2020-03-08 NOTE — LETTER
[facility]  [address] [address]  Phone: [phone]        March 10, 2020      Ken Brock                                                                                                                                [address] [address]            Dear Mr. Brock,    We recently provided you with a 30-day medication refill.  This medication requires routine follow-up with your care provider.    Please schedule an appointment for follow up appt before this refill supply is out.  You are due for an office visit.    Thank you,        Koffi Dallas MD / kristan rushing

## 2020-03-09 NOTE — TELEPHONE ENCOUNTER
"Requested Prescriptions   Pending Prescriptions Disp Refills     levothyroxine (SYNTHROID/LEVOTHROID) 112 MCG tablet [Pharmacy Med Name: Levothyroxine Sodium Oral Tablet 112 MCG] 60 tablet 2     Sig: TAKE 2 TABLETS (224 MCG) BY MOUTH DAILY       Thyroid Protocol Failed - 3/8/2020  7:00 AM        Failed - Recent (12 mo) or future (30 days) visit within the authorizing provider's specialty     Patient has had an office visit with the authorizing provider or a provider within the authorizing providers department within the previous 12 mos or has a future within next 30 days. See \"Patient Info\" tab in inbasket, or \"Choose Columns\" in Meds & Orders section of the refill encounter.              Passed - Patient is 12 years or older        Passed - Medication is active on med list        Passed - Normal TSH on file in past 12 months     Recent Labs   Lab Test 06/26/19  1942   TSH 0.44                 Last Written Prescription Date:  2/8/2019  Last Fill Quantity: 180,  # refills: 3   Last office visit: 2/8/2019 with prescribing provider:  Burt   Future Office Visit:      "

## 2020-03-10 RX ORDER — LEVOTHYROXINE SODIUM 112 UG/1
TABLET ORAL
Qty: 60 TABLET | Refills: 0 | Status: SHIPPED | OUTPATIENT
Start: 2020-03-10 | End: 2020-06-15

## 2020-03-10 NOTE — TELEPHONE ENCOUNTER
Medication is being filled for 1 time refill only due to:  Patient needs to be seen because it has been more than one year since last visit.  Needs office visit.    Reminder letter sent.    Fannie Max RN

## 2020-06-11 ENCOUNTER — TELEPHONE (OUTPATIENT)
Dept: FAMILY MEDICINE | Facility: CLINIC | Age: 55
End: 2020-06-11

## 2020-06-11 DIAGNOSIS — E03.9 ACQUIRED HYPOTHYROIDISM: ICD-10-CM

## 2020-06-11 NOTE — LETTER
[facility]  [address] [address]  Phone: [phone]       Sherry 15, 2020         Ken Brock  [address] [address]            Dear Ken:    We are concerned about your health care.  We recently provided you with medication refills.  Many medications require routine follow-up with your doctor.    Your prescription(s) have been refilled for 30 days so you may have time for the above noted follow-up. Please call to schedule soon so we can assure you have an appointment before your next refills are needed.    Thank you,      Koffi Dallas MD / hugo

## 2020-06-11 NOTE — TELEPHONE ENCOUNTER
Last Written Prescription Date:  Levothyroxine  3/10/2020  Last Fill Quantity: 30  # refills: 0   Last office visit: 2/8/2019 with prescribing provider:  Burt   Future Office Visit:        Patient has 6 pills left. He thinks he should just have to get blood work done. Please advise.  Cindy Santamaria  [facility]

## 2020-06-12 NOTE — TELEPHONE ENCOUNTER
"Requested Prescriptions   Pending Prescriptions Disp Refills     levothyroxine (SYNTHROID/LEVOTHROID) 112 MCG tablet 60 tablet 0       Thyroid Protocol Failed - 6/11/2020  4:23 PM        Failed - Recent (12 mo) or future (30 days) visit within the authorizing provider's specialty     Patient has had an office visit with the authorizing provider or a provider within the authorizing providers department within the previous 12 mos or has a future within next 30 days. See \"Patient Info\" tab in inbasket, or \"Choose Columns\" in Meds & Orders section of the refill encounter.              Passed - Patient is 12 years or older        Passed - Medication is active on med list        Passed - Normal TSH on file in past 12 months     Recent Labs   Lab Test 06/26/19 1942   TSH 0.44                   "

## 2020-06-12 NOTE — TELEPHONE ENCOUNTER
I have attempted to contact this patient by phone with the following results: voicemail has not been set up yet.    Kirsten Braden RN

## 2020-06-15 RX ORDER — LEVOTHYROXINE SODIUM 112 UG/1
TABLET ORAL
Qty: 60 TABLET | Refills: 0 | Status: SHIPPED | OUTPATIENT
Start: 2020-06-15 | End: 2020-07-10

## 2020-06-15 RX ORDER — LEVOTHYROXINE SODIUM 112 UG/1
TABLET ORAL
Qty: 60 TABLET | Refills: 0 | OUTPATIENT
Start: 2020-06-15

## 2020-06-15 NOTE — TELEPHONE ENCOUNTER
Routing refill request to provider for review/approval because:  Rashmi given x1 (3/08/20) and patient did not follow up, please advise  TSH   Date Value Ref Range Status   06/26/2019 0.44 0.40 - 4.00 mU/L Final     Last office visit: 2/8/2019 with prescribing provider:    Future Office Visit:  None.     Afshan MELGOZA BSN, RN

## 2020-07-09 NOTE — PROGRESS NOTES
"Subjective     Ken Brock is a 54 year old male who presents to clinic today for the following health issues:    HPI   Hypothyroidism Follow-up      Since last visit, patient describes the following symptoms: Weight stable, no hair loss, no skin changes, no constipation, no loose stools      How many servings of fruits and vegetables do you eat daily?  2-3    On average, how many sweetened beverages do you drink each day (Examples: soda, juice, sweet tea, etc.  Do NOT count diet or artificially sweetened beverages)?   1    How many days per week do you exercise enough to make your heart beat faster? 3 or less. Work is exersize    How many minutes a day do you exercise enough to make your heart beat faster? 9 or less  How many days per week do you miss taking your medication? 2    What makes it hard for you to take your medications?  remembering to take      Current Outpatient Medications   Medication Instructions     levothyroxine (SYNTHROID/LEVOTHROID) 112 MCG tablet TAKE 2 TABLETS (224 MCG) BY MOUTH DAILY       Patient Active Problem List   Diagnosis     Toxic diffuse goiter     CARDIOVASCULAR SCREENING; LDL GOAL LESS THAN 160     Acquired hypothyroidism       Blood pressure 130/68, pulse 66, temperature 97.2  F (36.2  C), temperature source Tympanic, resp. rate 12, height 1.768 m (5' 9.6\"), weight 77.2 kg (170 lb 3.2 oz), SpO2 97 %.    Exam:  GENERAL APPEARANCE: healthy, alert and no distress  EYES: EOMI,  PERRL  NECK: no adenopathy, no asymmetry, masses, or scars and thyroid normal to palpation  CV: regular rates and rhythm, normal S1 S2, no S3 or S4 and no murmur, click or rub -  MS: extremities normal- no gross deformities noted, no evidence of inflammation in joints, FROM in all extremities.  SKIN: no suspicious lesions or rashes  PSYCH: mentation appears normal and affect normal/bright      (E03.9) Acquired hypothyroidism  Comment:   Plan: levothyroxine (SYNTHROID/LEVOTHROID) 112 MCG         tablet, " TSH, T4 FREE        We discussed the med and the labs. Take the med every day for 2 weeks and then call for the lab appt at [phone].   We will call the results and refills are done for one year. Monitor for the symptoms of high and low thyroid.     (Z12.11) Colon cancer screening   Comment:   Plan: Fecal colorectal cancer screen (FIT)        Do this at home and mail it in. We will call the results.   Monitor the BMs for changes.       Koffi Dallas MD

## 2020-07-10 ENCOUNTER — OFFICE VISIT (OUTPATIENT)
Dept: FAMILY MEDICINE | Facility: CLINIC | Age: 55
End: 2020-07-10
Payer: COMMERCIAL

## 2020-07-10 VITALS
HEART RATE: 66 BPM | OXYGEN SATURATION: 97 % | SYSTOLIC BLOOD PRESSURE: 130 MMHG | BODY MASS INDEX: 24.37 KG/M2 | RESPIRATION RATE: 12 BRPM | TEMPERATURE: 97.2 F | HEIGHT: 70 IN | DIASTOLIC BLOOD PRESSURE: 68 MMHG | WEIGHT: 170.2 LBS

## 2020-07-10 DIAGNOSIS — Z12.11 COLON CANCER SCREENING: Primary | ICD-10-CM

## 2020-07-10 DIAGNOSIS — E03.9 ACQUIRED HYPOTHYROIDISM: ICD-10-CM

## 2020-07-10 PROCEDURE — 99213 OFFICE O/P EST LOW 20 MIN: CPT | Performed by: FAMILY MEDICINE

## 2020-07-10 RX ORDER — LEVOTHYROXINE SODIUM 112 UG/1
TABLET ORAL
Qty: 180 TABLET | Refills: 3 | Status: SHIPPED | OUTPATIENT
Start: 2020-07-10

## 2020-07-10 ASSESSMENT — MIFFLIN-ST. JEOR: SCORE: 1621.92

## 2020-07-10 NOTE — NURSING NOTE
"Initial /68   Pulse 66   Temp 97.2  F (36.2  C) (Tympanic)   Resp 12   Ht 1.768 m (5' 9.6\")   Wt 77.2 kg (170 lb 3.2 oz)   SpO2 97%   BMI 24.70 kg/m   Estimated body mass index is 24.7 kg/m  as calculated from the following:    Height as of this encounter: 1.768 m (5' 9.6\").    Weight as of this encounter: 77.2 kg (170 lb 3.2 oz). .      "

## 2020-07-10 NOTE — PATIENT INSTRUCTIONS
(E03.9) Acquired hypothyroidism  Comment:   Plan: levothyroxine (SYNTHROID/LEVOTHROID) 112 MCG         tablet, TSH, T4 FREE        We discussed the med and the labs. Take the med every day for 2 weeks and then call for the lab appt at [phone].   We will call the results and refills are done for one year. Monitor for the symptoms of high and low thyroid.     (Z12.11) Colon cancer screening   Comment:   Plan: Fecal colorectal cancer screen (FIT)        Do this at home and mail it in. We will call the results.   Monitor the BMs for changes.

## 2024-03-27 NOTE — PROGRESS NOTES
Please SEND LETTER    Notify patient of NORMAL results. Please repeat the stool test in one year.       21.6